# Patient Record
Sex: MALE | Race: WHITE | NOT HISPANIC OR LATINO | Employment: FULL TIME | RURAL
[De-identification: names, ages, dates, MRNs, and addresses within clinical notes are randomized per-mention and may not be internally consistent; named-entity substitution may affect disease eponyms.]

---

## 2020-02-27 ENCOUNTER — HISTORICAL (OUTPATIENT)
Dept: ADMINISTRATIVE | Facility: HOSPITAL | Age: 25
End: 2020-02-27

## 2020-03-03 LAB
LAB AP CLINICAL INFORMATION: NORMAL
LAB AP DIAGNOSIS - HISTORICAL: NORMAL
LAB AP GROSS PATHOLOGY - HISTORICAL: NORMAL
LAB AP SPECIMEN SUBMITTED - HISTORICAL: NORMAL

## 2021-02-19 ENCOUNTER — HISTORICAL (OUTPATIENT)
Dept: ADMINISTRATIVE | Facility: HOSPITAL | Age: 26
End: 2021-02-19

## 2022-10-28 ENCOUNTER — HOSPITAL ENCOUNTER (OUTPATIENT)
Dept: RADIOLOGY | Facility: HOSPITAL | Age: 27
Discharge: HOME | End: 2022-10-28
Attending: ORTHOPAEDIC SURGERY
Payer: COMMERCIAL

## 2022-10-28 DIAGNOSIS — M25.522 ELBOW PAIN, LEFT: ICD-10-CM

## 2022-10-28 DIAGNOSIS — M25.521 ELBOW PAIN, RIGHT: ICD-10-CM

## 2022-10-28 PROCEDURE — 73070 X-RAY EXAM OF ELBOW: CPT | Mod: TC,LT

## 2022-11-04 ENCOUNTER — HOSPITAL ENCOUNTER (OUTPATIENT)
Dept: RADIOLOGY | Facility: HOSPITAL | Age: 27
Discharge: HOME | End: 2022-11-04
Attending: ORTHOPAEDIC SURGERY
Payer: COMMERCIAL

## 2022-11-04 DIAGNOSIS — M25.522 LEFT ELBOW PAIN: ICD-10-CM

## 2022-11-04 PROCEDURE — 73221 MRI ELBOW WITHOUT CONTRAST LEFT: ICD-10-PCS | Mod: 26,LT,, | Performed by: RADIOLOGY

## 2022-11-04 PROCEDURE — 73221 MRI JOINT UPR EXTREM W/O DYE: CPT | Mod: TC,LT

## 2022-11-04 PROCEDURE — 73221 MRI JOINT UPR EXTREM W/O DYE: CPT | Mod: 26,LT,, | Performed by: RADIOLOGY

## 2022-12-31 ENCOUNTER — OFFICE VISIT (OUTPATIENT)
Dept: FAMILY MEDICINE | Facility: CLINIC | Age: 27
End: 2022-12-31
Payer: COMMERCIAL

## 2022-12-31 VITALS
HEART RATE: 94 BPM | BODY MASS INDEX: 25.57 KG/M2 | HEIGHT: 76 IN | TEMPERATURE: 99 F | DIASTOLIC BLOOD PRESSURE: 78 MMHG | OXYGEN SATURATION: 98 % | RESPIRATION RATE: 18 BRPM | WEIGHT: 210 LBS | SYSTOLIC BLOOD PRESSURE: 130 MMHG

## 2022-12-31 DIAGNOSIS — J02.9 SORE THROAT: ICD-10-CM

## 2022-12-31 DIAGNOSIS — R50.9 FEVER, UNSPECIFIED FEVER CAUSE: ICD-10-CM

## 2022-12-31 DIAGNOSIS — J02.0 STREP PHARYNGITIS: Primary | ICD-10-CM

## 2022-12-31 PROBLEM — I72.0 CAROTID PSEUDOANEURYSM: Status: ACTIVE | Noted: 2018-12-26

## 2022-12-31 LAB
CTP QC/QA: YES
CTP QC/QA: YES
FLUAV AG NPH QL: NEGATIVE
FLUBV AG NPH QL: NEGATIVE
S PYO RRNA THROAT QL PROBE: POSITIVE
SARS-COV-2 AG RESP QL IA.RAPID: NEGATIVE

## 2022-12-31 PROCEDURE — 3075F SYST BP GE 130 - 139MM HG: CPT | Mod: CPTII,,, | Performed by: NURSE PRACTITIONER

## 2022-12-31 PROCEDURE — 99051 MED SERV EVE/WKEND/HOLIDAY: CPT | Mod: ,,, | Performed by: NURSE PRACTITIONER

## 2022-12-31 PROCEDURE — 3008F BODY MASS INDEX DOCD: CPT | Mod: CPTII,,, | Performed by: NURSE PRACTITIONER

## 2022-12-31 PROCEDURE — 1159F PR MEDICATION LIST DOCUMENTED IN MEDICAL RECORD: ICD-10-PCS | Mod: CPTII,,, | Performed by: NURSE PRACTITIONER

## 2022-12-31 PROCEDURE — 1160F RVW MEDS BY RX/DR IN RCRD: CPT | Mod: CPTII,,, | Performed by: NURSE PRACTITIONER

## 2022-12-31 PROCEDURE — 87880 STREP A ASSAY W/OPTIC: CPT | Mod: QW,,, | Performed by: NURSE PRACTITIONER

## 2022-12-31 PROCEDURE — 99213 PR OFFICE/OUTPT VISIT, EST, LEVL III, 20-29 MIN: ICD-10-PCS | Mod: 25,,, | Performed by: NURSE PRACTITIONER

## 2022-12-31 PROCEDURE — 96372 PR INJECTION,THERAP/PROPH/DIAG2ST, IM OR SUBCUT: ICD-10-PCS | Mod: ,,, | Performed by: NURSE PRACTITIONER

## 2022-12-31 PROCEDURE — 99051 PR MEDICAL SERVICES, EVE/WKEND/HOLIDAY: ICD-10-PCS | Mod: ,,, | Performed by: NURSE PRACTITIONER

## 2022-12-31 PROCEDURE — 3075F PR MOST RECENT SYSTOLIC BLOOD PRESS GE 130-139MM HG: ICD-10-PCS | Mod: CPTII,,, | Performed by: NURSE PRACTITIONER

## 2022-12-31 PROCEDURE — 3008F PR BODY MASS INDEX (BMI) DOCUMENTED: ICD-10-PCS | Mod: CPTII,,, | Performed by: NURSE PRACTITIONER

## 2022-12-31 PROCEDURE — 1159F MED LIST DOCD IN RCRD: CPT | Mod: CPTII,,, | Performed by: NURSE PRACTITIONER

## 2022-12-31 PROCEDURE — 99213 OFFICE O/P EST LOW 20 MIN: CPT | Mod: 25,,, | Performed by: NURSE PRACTITIONER

## 2022-12-31 PROCEDURE — 96372 THER/PROPH/DIAG INJ SC/IM: CPT | Mod: ,,, | Performed by: NURSE PRACTITIONER

## 2022-12-31 PROCEDURE — 3078F DIAST BP <80 MM HG: CPT | Mod: CPTII,,, | Performed by: NURSE PRACTITIONER

## 2022-12-31 PROCEDURE — 87428 SARSCOV & INF VIR A&B AG IA: CPT | Mod: QW,,, | Performed by: NURSE PRACTITIONER

## 2022-12-31 PROCEDURE — 1160F PR REVIEW ALL MEDS BY PRESCRIBER/CLIN PHARMACIST DOCUMENTED: ICD-10-PCS | Mod: CPTII,,, | Performed by: NURSE PRACTITIONER

## 2022-12-31 PROCEDURE — 87880 POCT RAPID STREP A: ICD-10-PCS | Mod: QW,,, | Performed by: NURSE PRACTITIONER

## 2022-12-31 PROCEDURE — 3078F PR MOST RECENT DIASTOLIC BLOOD PRESSURE < 80 MM HG: ICD-10-PCS | Mod: CPTII,,, | Performed by: NURSE PRACTITIONER

## 2022-12-31 PROCEDURE — 87428 POCT SARS-COV2 (COVID) WITH FLU ANTIGEN: ICD-10-PCS | Mod: QW,,, | Performed by: NURSE PRACTITIONER

## 2022-12-31 RX ORDER — DEXAMETHASONE SODIUM PHOSPHATE 4 MG/ML
6 INJECTION, SOLUTION INTRA-ARTICULAR; INTRALESIONAL; INTRAMUSCULAR; INTRAVENOUS; SOFT TISSUE
Status: COMPLETED | OUTPATIENT
Start: 2022-12-31 | End: 2022-12-31

## 2022-12-31 RX ADMIN — DEXAMETHASONE SODIUM PHOSPHATE 6 MG: 4 INJECTION, SOLUTION INTRA-ARTICULAR; INTRALESIONAL; INTRAMUSCULAR; INTRAVENOUS; SOFT TISSUE at 11:12

## 2022-12-31 NOTE — PATIENT INSTRUCTIONS
-Throw toothbrush away in 2 days and replace with new one.   -OTC tylenol/ibuprofen as directed for pain/temperature 100.4 or greater.

## 2022-12-31 NOTE — PROGRESS NOTES
[facility]    Chief Complaint      Chief Complaint   Patient presents with    Sore Throat    Generalized Body Aches    Fatigue    Fever     States symptoms present about 2 days with OTC medications in use     Cough     History of Present Illness      Travon Pang is a 27 y.o. male  who presents today for c/o sore throat x2 days. He states that he was treated for strep pharyngitis about a month ago at the [facility]. He inquires about tonsillectomy but does not wish to be referred to ENT at this time.     Sore Throat   This is a new problem. The current episode started in the past 7 days. The problem has been gradually worsening. The pain is worse on the right side. The pain is at a severity of 7/10. The pain is moderate. Associated symptoms include headaches, a hoarse voice, trouble swallowing and vomiting (x1 yesterday). Pertinent negatives include no abdominal pain, congestion, coughing, ear discharge, ear pain, plugged ear sensation, shortness of breath or stridor. He has had exposure to strep. He has had no exposure to mono. He has tried NSAIDs for the symptoms. The treatment provided moderate relief.     Past Medical History:  No past medical history on file.    Past Surgical History:   has no past surgical history on file.    Social History:  Social History     Tobacco Use    Smoking status: Every Day     Types: Vaping with nicotine    Smokeless tobacco: Never     I personally reviewed all past medical, surgical, and social.     Review of Systems   Constitutional:  Positive for chills and malaise/fatigue. Negative for fever.   HENT:  Positive for hoarse voice and trouble swallowing. Negative for congestion, ear discharge and ear pain.    Eyes:  Negative for discharge and redness.   Respiratory:  Negative for cough, shortness of breath and stridor.    Cardiovascular:  Negative for chest pain.   Gastrointestinal:  Positive for nausea and vomiting (x1 yesterday). Negative for abdominal pain.  "  Genitourinary:  Negative for dysuria, frequency and urgency.   Musculoskeletal:  Negative for myalgias.   Skin:  Negative for itching and rash.   Neurological:  Positive for headaches.   Endo/Heme/Allergies:  Negative for environmental allergies. Does not bruise/bleed easily.   Psychiatric/Behavioral:  Negative for depression and suicidal ideas.       Medications:  No outpatient encounter medications on file as of 12/31/2022.     Facility-Administered Encounter Medications as of 12/31/2022   Medication Dose Route Frequency Provider Last Rate Last Admin    [COMPLETED] dexAMETHasone injection 6 mg  6 mg Intramuscular 1 time in Clinic/HOD JOLLY Siddiqui   6 mg at 12/31/22 1105    [COMPLETED] penicillin G benzathine (BICILLIN LA) injection 1.2 Million Units  1.2 Million Units Intramuscular Once JOLLY Siddiqui   1.2 Million Units at 12/31/22 1106     Allergies:  Review of patient's allergies indicates:  No Known Allergies    Health Maintenance:    There is no immunization history on file for this patient.   Health Maintenance   Topic Date Due    Hepatitis C Screening  Never done    TETANUS VACCINE  Never done    Lipid Panel  Completed      Physical Exam      Vital Signs  Temp: 98.9 °F (37.2 °C)  Pulse: 94  Resp: 18  SpO2: 98 %  BP: 130/78  Pain Score:   7  Pain Loc: Throat  Height and Weight  Height: 6' 4" (193 cm)  Weight: 95.3 kg (210 lb)  BSA (Calculated - sq m): 2.26 sq meters  BMI (Calculated): 25.6  Weight in (lb) to have BMI = 25: 205]    Physical Exam  Vitals reviewed.   Constitutional:       Appearance: Normal appearance.   HENT:      Head: Normocephalic.      Right Ear: External ear normal.      Left Ear: External ear normal.      Nose: Congestion and rhinorrhea present. Rhinorrhea is clear.      Right Turbinates: Swollen.      Left Turbinates: Swollen.      Mouth/Throat:      Mouth: Mucous membranes are moist.      Pharynx: Pharyngeal swelling and posterior oropharyngeal erythema present.      " Tonsils: Tonsillar exudate present. 3+ on the right. 3+ on the left.   Eyes:      Conjunctiva/sclera: Conjunctivae normal.   Cardiovascular:      Rate and Rhythm: Normal rate and regular rhythm.      Pulses: Normal pulses.      Heart sounds: Normal heart sounds. No murmur heard.  Pulmonary:      Effort: Pulmonary effort is normal. No respiratory distress.      Breath sounds: Normal breath sounds.   Abdominal:      General: Bowel sounds are normal.   Musculoskeletal:         General: Normal range of motion.      Cervical back: Normal range of motion.   Skin:     General: Skin is warm and dry.      Capillary Refill: Capillary refill takes less than 2 seconds.   Neurological:      Mental Status: He is alert and oriented to person, place, and time.   Psychiatric:         Mood and Affect: Mood normal.         Behavior: Behavior normal.         Thought Content: Thought content normal.         Judgment: Judgment normal.      Laboratory:    CBC:  Recent Labs   Lab 07/07/22  1600   WBC 5.97   RBC 5.36   Hemoglobin 16.4   Hematocrit 49.2   Platelet Count 283   MCV 91.8   MCH 30.6   MCHC 33.3     CMP:  Recent Labs   Lab 07/07/22  1600   Glucose 96   Calcium 9.9   Albumin 4.8   Total Protein 8.3 H   Sodium 137   Potassium 4.8   CO2 27   Chloride 102   BUN 14   Alk Phos 81   ALT 38   AST 20   Bilirubin, Total 0.5     LIPIDS:  Recent Labs   Lab 07/07/22  1600   HDL Cholesterol 69 H   Cholesterol 234 H   Triglycerides 69   LDL Calculated 151   Cholesterol/HDL Ratio (Risk Factor) 3.4   Non-     Assessment/Plan     Travon Pang is a 27 y.o.male with:    1. Fever, unspecified fever cause  - POCT SARS-COV2 (COVID) with Flu Antigen    2. Sore throat  - POCT rapid strep A    3. Strep pharyngitis  - penicillin G benzathine (BICILLIN LA) injection 1.2 Million Units  - dexAMETHasone injection 6 mg  -Throw toothbrush away in 2 days and replace with new one.   -OTC tylenol/ibuprofen as directed for pain/temperature 100.4 or greater.       Chronic conditions status updated as per HPI.  Other than changes above, cont current medications and maintain follow up with specialists.  Return to clinic as needed.     Hetal Daniels P  [facility]

## 2025-06-03 ENCOUNTER — TELEPHONE (OUTPATIENT)
Dept: ORTHOPEDICS | Facility: CLINIC | Age: 30
End: 2025-06-03
Payer: COMMERCIAL